# Patient Record
Sex: FEMALE | Race: WHITE | Employment: FULL TIME | URBAN - NONMETROPOLITAN AREA
[De-identification: names, ages, dates, MRNs, and addresses within clinical notes are randomized per-mention and may not be internally consistent; named-entity substitution may affect disease eponyms.]

---

## 2024-05-19 ENCOUNTER — APPOINTMENT (OUTPATIENT)
Dept: GENERAL RADIOLOGY | Age: 24
End: 2024-05-19
Payer: OTHER GOVERNMENT

## 2024-05-19 ENCOUNTER — HOSPITAL ENCOUNTER (EMERGENCY)
Age: 24
Discharge: HOME | End: 2024-05-19
Payer: OTHER GOVERNMENT

## 2024-05-19 VITALS
WEIGHT: 156 LBS | DIASTOLIC BLOOD PRESSURE: 71 MMHG | HEART RATE: 78 BPM | SYSTOLIC BLOOD PRESSURE: 121 MMHG | OXYGEN SATURATION: 100 % | RESPIRATION RATE: 20 BRPM | TEMPERATURE: 98.1 F

## 2024-05-19 DIAGNOSIS — M75.52 ACUTE BURSITIS OF LEFT SHOULDER: Primary | ICD-10-CM

## 2024-05-19 PROCEDURE — 99203 OFFICE O/P NEW LOW 30 MIN: CPT

## 2024-05-19 PROCEDURE — 73030 X-RAY EXAM OF SHOULDER: CPT

## 2024-05-19 PROCEDURE — 99203 OFFICE O/P NEW LOW 30 MIN: CPT | Performed by: NURSE PRACTITIONER

## 2024-05-19 RX ORDER — MENTHOL 787.5 MG/1
PATCH TOPICAL
Refills: 0 | COMMUNITY
Start: 2024-05-19

## 2024-05-19 RX ORDER — ALBUTEROL SULFATE 90 UG/1
2 AEROSOL, METERED RESPIRATORY (INHALATION) EVERY 6 HOURS PRN
COMMUNITY

## 2024-05-19 RX ORDER — IBUPROFEN 400 MG/1
400 TABLET ORAL EVERY 6 HOURS PRN
Qty: 120 TABLET | Refills: 0 | Status: SHIPPED | OUTPATIENT
Start: 2024-05-19

## 2024-05-19 ASSESSMENT — PAIN DESCRIPTION - LOCATION: LOCATION: SHOULDER

## 2024-05-19 ASSESSMENT — PAIN DESCRIPTION - ORIENTATION: ORIENTATION: LEFT

## 2024-05-19 ASSESSMENT — PAIN SCALES - GENERAL: PAINLEVEL_OUTOF10: 2

## 2024-05-19 ASSESSMENT — PAIN - FUNCTIONAL ASSESSMENT: PAIN_FUNCTIONAL_ASSESSMENT: 0-10
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
## 2024-10-11 ENCOUNTER — LAB (OUTPATIENT)
Dept: LAB | Age: 24
End: 2024-10-11

## 2024-10-16 LAB
SOURCE: NORMAL
TRICHOMONAS VAGINALI, MOLECULAR: NEGATIVE

## 2024-10-17 LAB
C. TRACHOMATIS DNA,THIN PREP: NEGATIVE
N. GONORRHOEAE DNA, THIN PREP: NEGATIVE
SOURCE: NORMAL

## 2024-10-21 ENCOUNTER — HOSPITAL ENCOUNTER (OUTPATIENT)
Age: 24
Discharge: HOME | End: 2024-10-21
Payer: OTHER GOVERNMENT

## 2024-10-21 LAB
ABO: NORMAL
ANTIBODY SCREEN: NORMAL
DEPRECATED RDW RBC AUTO: 40 FL (ref 35–45)
ERYTHROCYTE [DISTWIDTH] IN BLOOD BY AUTOMATED COUNT: 12.3 % (ref 11.5–14.5)
HBV SURFACE AG SERPL QL IA: NEGATIVE
HCT VFR BLD AUTO: 36.9 % (ref 37–47)
HCV IGG SERPL QL IA: NEGATIVE
HGB BLD-MCNC: 12.7 GM/DL (ref 12–16)
HIV 1+2 AB+HIV1 P24 AG SERPL QL IA: NORMAL
MCH RBC QN AUTO: 30.5 PG (ref 26–33)
MCHC RBC AUTO-ENTMCNC: 34.4 GM/DL (ref 32.2–35.5)
MCV RBC AUTO: 88.7 FL (ref 81–99)
PLATELET # BLD AUTO: 267 THOU/MM3 (ref 130–400)
PMV BLD AUTO: 9.6 FL (ref 9.4–12.4)
RBC # BLD AUTO: 4.16 MILL/MM3 (ref 4.2–5.4)
RH FACTOR: NORMAL
RPR SER QL: NONREACTIVE
RUBV IGG SERPL IA-ACNC: 99.2 IU/ML
WBC # BLD AUTO: 9.6 THOU/MM3 (ref 4.8–10.8)

## 2024-10-21 PROCEDURE — 86803 HEPATITIS C AB TEST: CPT

## 2024-10-21 PROCEDURE — 86900 BLOOD TYPING SEROLOGIC ABO: CPT

## 2024-10-21 PROCEDURE — 86850 RBC ANTIBODY SCREEN: CPT

## 2024-10-21 PROCEDURE — 86762 RUBELLA ANTIBODY: CPT

## 2024-10-21 PROCEDURE — 86592 SYPHILIS TEST NON-TREP QUAL: CPT

## 2024-10-21 PROCEDURE — 87086 URINE CULTURE/COLONY COUNT: CPT

## 2024-10-21 PROCEDURE — 87389 HIV-1 AG W/HIV-1&-2 AB AG IA: CPT

## 2024-10-21 PROCEDURE — 87340 HEPATITIS B SURFACE AG IA: CPT

## 2024-10-21 PROCEDURE — 86901 BLOOD TYPING SEROLOGIC RH(D): CPT

## 2024-10-21 PROCEDURE — 85027 COMPLETE CBC AUTOMATED: CPT

## 2024-10-21 PROCEDURE — 36415 COLL VENOUS BLD VENIPUNCTURE: CPT

## 2024-10-22 LAB
BACTERIA UR CULT: ABNORMAL
ORGANISM: ABNORMAL

## 2024-10-24 LAB — CYTOLOGY THIN PREP PAP: NORMAL

## 2025-02-13 ENCOUNTER — HOSPITAL ENCOUNTER (OUTPATIENT)
Age: 25
Discharge: HOME | End: 2025-02-13
Payer: OTHER GOVERNMENT

## 2025-02-13 LAB
DEPRECATED RDW RBC AUTO: 40.2 FL (ref 35–45)
ERYTHROCYTE [DISTWIDTH] IN BLOOD BY AUTOMATED COUNT: 12.6 % (ref 11.5–14.5)
GLUCOSE SERPL-MCNC: 133 MG/DL (ref 69–140)
HCT VFR BLD AUTO: 34.1 % (ref 37–47)
HGB BLD-MCNC: 11.6 GM/DL (ref 12–16)
MCH RBC QN AUTO: 30.1 PG (ref 26–33)
MCHC RBC AUTO-ENTMCNC: 34 GM/DL (ref 32.2–35.5)
MCV RBC AUTO: 88.3 FL (ref 81–99)
PLATELET # BLD AUTO: 267 THOU/MM3 (ref 130–400)
PMV BLD AUTO: 9.8 FL (ref 9.4–12.4)
RBC # BLD AUTO: 3.86 MILL/MM3 (ref 4.2–5.4)
WBC # BLD AUTO: 17.3 THOU/MM3 (ref 4.8–10.8)

## 2025-02-13 PROCEDURE — 85027 COMPLETE CBC AUTOMATED: CPT

## 2025-02-13 PROCEDURE — 86592 SYPHILIS TEST NON-TREP QUAL: CPT

## 2025-02-13 PROCEDURE — 36415 COLL VENOUS BLD VENIPUNCTURE: CPT

## 2025-02-13 PROCEDURE — 82950 GLUCOSE TEST: CPT

## 2025-02-14 LAB — RPR SER QL: NONREACTIVE

## 2025-02-24 ENCOUNTER — HOSPITAL ENCOUNTER (OUTPATIENT)
Dept: NURSING | Age: 25
Discharge: HOME | End: 2025-02-24
Payer: OTHER GOVERNMENT

## 2025-02-24 VITALS
SYSTOLIC BLOOD PRESSURE: 129 MMHG | OXYGEN SATURATION: 97 % | TEMPERATURE: 97.7 F | RESPIRATION RATE: 18 BRPM | DIASTOLIC BLOOD PRESSURE: 82 MMHG | HEART RATE: 76 BPM

## 2025-02-24 PROCEDURE — 96372 THER/PROPH/DIAG INJ SC/IM: CPT

## 2025-02-24 PROCEDURE — 6360000002 HC RX W HCPCS: Performed by: STUDENT IN AN ORGANIZED HEALTH CARE EDUCATION/TRAINING PROGRAM

## 2025-02-24 RX ORDER — BETAMETHASONE SODIUM PHOSPHATE AND BETAMETHASONE ACETATE 3; 3 MG/ML; MG/ML
12 INJECTION, SUSPENSION INTRA-ARTICULAR; INTRALESIONAL; INTRAMUSCULAR; SOFT TISSUE ONCE
Status: CANCELLED
Start: 2025-02-25 | End: 2025-02-25

## 2025-02-24 RX ORDER — BETAMETHASONE SODIUM PHOSPHATE AND BETAMETHASONE ACETATE 3; 3 MG/ML; MG/ML
12 INJECTION, SUSPENSION INTRA-ARTICULAR; INTRALESIONAL; INTRAMUSCULAR; SOFT TISSUE ONCE
Status: CANCELLED
Start: 2025-02-26

## 2025-02-24 RX ORDER — BETAMETHASONE SODIUM PHOSPHATE AND BETAMETHASONE ACETATE 3; 3 MG/ML; MG/ML
12 INJECTION, SUSPENSION INTRA-ARTICULAR; INTRALESIONAL; INTRAMUSCULAR; SOFT TISSUE ONCE
Status: COMPLETED | OUTPATIENT
Start: 2025-02-24 | End: 2025-02-24

## 2025-02-24 RX ADMIN — BETAMETHASONE SODIUM PHOSPHATE AND BETAMETHASONE ACETATE 12 MG: 3; 3 INJECTION, SUSPENSION INTRA-ARTICULAR; INTRALESIONAL; INTRAMUSCULAR at 15:01

## 2025-02-24 NOTE — PROGRESS NOTES
1500 Patient ambulatory to OPN for IM injection of Betamethasone. Patient verbalizes understanding of infusion. PT RIGHTS AND RESPONSIBILITIES OFFERED TO PT.    1501 Injection given to patient. She tolerated well. AVS Reviewed with patient. Verbalizes understanding.     1510 Patient discharged ambulatory to discharge noris.         _M___ Safety:       (Environmental)  [place] to environment  Ensure ID band is correct and in place/ allergy band as needed  Assess for fall risk  Initiate fall precautions as applicable (fall band, side rails, etc.)  Call light within reach  Bed in low position/ wheels locked    _M___ Pain:       Assess pain level and characteristics  Administer analgesics as ordered  Assess effectiveness of pain management and report to MD as needed    _M___ Knowledge Deficit:  Assess baseline knowledge  Provide teaching at level of understanding  Provide teaching via preferred learning method  Evaluate teaching effectiveness    _M___ Hemodynamic/Respiratory Status:       (Pre and Post Procedure Monitoring)  Assess/Monitor vital signs and LOC  Assess Baseline SpO2 prior to any sedation  Obtain weight/height  Assess vital signs/ LOC until patient meets discharge criteria  Monitor procedure site and notify MD of any issues

## 2025-02-25 ENCOUNTER — HOSPITAL ENCOUNTER (OUTPATIENT)
Dept: NURSING | Age: 25
Discharge: HOME | End: 2025-02-25
Payer: OTHER GOVERNMENT

## 2025-02-25 VITALS
RESPIRATION RATE: 18 BRPM | OXYGEN SATURATION: 98 % | SYSTOLIC BLOOD PRESSURE: 121 MMHG | DIASTOLIC BLOOD PRESSURE: 60 MMHG | HEART RATE: 80 BPM | TEMPERATURE: 96.8 F

## 2025-02-25 PROCEDURE — 96372 THER/PROPH/DIAG INJ SC/IM: CPT

## 2025-02-25 PROCEDURE — 6360000002 HC RX W HCPCS: Performed by: STUDENT IN AN ORGANIZED HEALTH CARE EDUCATION/TRAINING PROGRAM

## 2025-02-25 RX ORDER — BETAMETHASONE SODIUM PHOSPHATE AND BETAMETHASONE ACETATE 3; 3 MG/ML; MG/ML
12 INJECTION, SUSPENSION INTRA-ARTICULAR; INTRALESIONAL; INTRAMUSCULAR; SOFT TISSUE ONCE
Status: COMPLETED | OUTPATIENT
Start: 2025-02-25 | End: 2025-02-25

## 2025-02-25 RX ADMIN — BETAMETHASONE SODIUM PHOSPHATE AND BETAMETHASONE ACETATE 12 MG: 3; 3 INJECTION, SUSPENSION INTRA-ARTICULAR; INTRALESIONAL; INTRAMUSCULAR at 14:50

## 2025-02-25 ASSESSMENT — PAIN - FUNCTIONAL ASSESSMENT: PAIN_FUNCTIONAL_ASSESSMENT: 0-10

## 2025-02-25 NOTE — PROGRESS NOTES
1445 pt admitted to opn per ambulation ofr a betamethosone injection.   Pt verbalize understanding of medication. Fletcher yesterday dose wll.  PT RIGHTS AND RESPONSIBILITIES OFFERED TO PT.  1450 SHOT GIVEN. FLETCHER WELL. STABLE.  DISCHARGE INSTRUCTIONS GIVEN TO PATIENT. VERBALIZE UNDERSTANDING OF HOME GOING.   1456 DISCHARGE PER AMBULATION TO HOME.                 _m___ Safety:       (Environmental)  [place] to environment  Ensure ID band is correct and in place/ allergy band as needed  Assess for fall risk  Initiate fall precautions as applicable (fall band, side rails, etc.)  Call light within reach  Bed in low position/ wheels locked    _m___ Pain:       Assess pain level and characteristics  Administer analgesics as ordered  Assess effectiveness of pain management and report to MD as needed    __m__ Knowledge Deficit:  Assess baseline knowledge  Provide teaching at level of understanding  Provide teaching via preferred learning method  Evaluate teaching effectiveness    __m__ Hemodynamic/Respiratory Status:       (Pre and Post Procedure Monitoring)  Assess/Monitor vital signs and LOC  Assess Baseline SpO2 prior to any sedation  Obtain weight/height  Assess vital signs/ LOC until patient meets discharge criteria  Monitor procedure site and notify MD of any issues

## 2025-03-13 ENCOUNTER — HOSPITAL ENCOUNTER (INPATIENT)
Age: 25
LOS: 2 days | Discharge: HOME | End: 2025-03-15
Attending: STUDENT IN AN ORGANIZED HEALTH CARE EDUCATION/TRAINING PROGRAM | Admitting: STUDENT IN AN ORGANIZED HEALTH CARE EDUCATION/TRAINING PROGRAM
Payer: OTHER GOVERNMENT

## 2025-03-13 ENCOUNTER — APPOINTMENT (OUTPATIENT)
Dept: LABOR AND DELIVERY | Age: 25
End: 2025-03-13
Payer: OTHER GOVERNMENT

## 2025-03-13 PROBLEM — O36.4XX0 FETAL DEMISE IN SINGLETON PREGNANCY GREATER THAN 22 WEEKS GESTATION, ANTEPARTUM: Status: ACTIVE | Noted: 2025-03-13

## 2025-03-13 LAB
ABO GROUP BLD: NORMAL
AMPHETAMINES UR QL SCN: NEGATIVE
BARBITURATES UR QL SCN: NEGATIVE
BASOPHILS ABSOLUTE: 0 THOU/MM3 (ref 0–0.1)
BASOPHILS NFR BLD AUTO: 0.4 %
BENZODIAZ UR QL SCN: NEGATIVE
BZE UR QL SCN: NEGATIVE
CANNABINOIDS UR QL SCN: NEGATIVE
DEPRECATED RDW RBC AUTO: 42.4 FL (ref 35–45)
EOSINOPHIL NFR BLD AUTO: 0.8 %
EOSINOPHILS ABSOLUTE: 0.1 THOU/MM3 (ref 0–0.4)
ERYTHROCYTE [DISTWIDTH] IN BLOOD BY AUTOMATED COUNT: 13 % (ref 11.5–14.5)
FENTANYL: NEGATIVE
HCT VFR BLD AUTO: 36.1 % (ref 37–47)
HGB BLD-MCNC: 12.2 GM/DL (ref 12–16)
IAT IGG-SP REAG SERPL QL: NORMAL
IMM GRANULOCYTES # BLD AUTO: 0.32 THOU/MM3 (ref 0–0.07)
IMM GRANULOCYTES NFR BLD AUTO: 2.6 %
LYMPHOCYTES ABSOLUTE: 2.2 THOU/MM3 (ref 1–4.8)
LYMPHOCYTES NFR BLD AUTO: 18.2 %
MCH RBC QN AUTO: 30 PG (ref 26–33)
MCHC RBC AUTO-ENTMCNC: 33.8 GM/DL (ref 32.2–35.5)
MCV RBC AUTO: 88.9 FL (ref 81–99)
MONOCYTES ABSOLUTE: 0.8 THOU/MM3 (ref 0.4–1.3)
MONOCYTES NFR BLD AUTO: 6.5 %
NEUTROPHILS ABSOLUTE: 8.8 THOU/MM3 (ref 1.8–7.7)
NEUTROPHILS NFR BLD AUTO: 71.5 %
NRBC BLD AUTO-RTO: 0 /100 WBC
OPIATES UR QL SCN: NEGATIVE
OXYCODONE: NEGATIVE
PCP UR QL SCN: NEGATIVE
PLATELET # BLD AUTO: 244 THOU/MM3 (ref 130–400)
PMV BLD AUTO: 10 FL (ref 9.4–12.4)
RBC # BLD AUTO: 4.06 MILL/MM3 (ref 4.2–5.4)
RH BLD: NORMAL
RPR SER QL: NONREACTIVE
WBC # BLD AUTO: 12.3 THOU/MM3 (ref 4.8–10.8)

## 2025-03-13 PROCEDURE — 85025 COMPLETE CBC W/AUTO DIFF WBC: CPT

## 2025-03-13 PROCEDURE — 6370000000 HC RX 637 (ALT 250 FOR IP): Performed by: STUDENT IN AN ORGANIZED HEALTH CARE EDUCATION/TRAINING PROGRAM

## 2025-03-13 PROCEDURE — 86901 BLOOD TYPING SEROLOGIC RH(D): CPT

## 2025-03-13 PROCEDURE — 80307 DRUG TEST PRSMV CHEM ANLYZR: CPT

## 2025-03-13 PROCEDURE — 1220000001 HC SEMI PRIVATE L&D R&B

## 2025-03-13 PROCEDURE — 86885 COOMBS TEST INDIRECT QUAL: CPT

## 2025-03-13 PROCEDURE — 2580000003 HC RX 258: Performed by: STUDENT IN AN ORGANIZED HEALTH CARE EDUCATION/TRAINING PROGRAM

## 2025-03-13 PROCEDURE — 86592 SYPHILIS TEST NON-TREP QUAL: CPT

## 2025-03-13 PROCEDURE — 86900 BLOOD TYPING SEROLOGIC ABO: CPT

## 2025-03-13 PROCEDURE — 6360000002 HC RX W HCPCS: Performed by: STUDENT IN AN ORGANIZED HEALTH CARE EDUCATION/TRAINING PROGRAM

## 2025-03-13 RX ORDER — PROCHLORPERAZINE MALEATE 10 MG
10 TABLET ORAL
Status: DISCONTINUED | OUTPATIENT
Start: 2025-03-13 | End: 2025-03-14

## 2025-03-13 RX ORDER — CARBOPROST TROMETHAMINE 250 UG/ML
250 INJECTION, SOLUTION INTRAMUSCULAR PRN
Status: DISCONTINUED | OUTPATIENT
Start: 2025-03-13 | End: 2025-03-14

## 2025-03-13 RX ORDER — MEPERIDINE HYDROCHLORIDE 25 MG/ML
50 INJECTION INTRAMUSCULAR; INTRAVENOUS; SUBCUTANEOUS
Status: DISCONTINUED | OUTPATIENT
Start: 2025-03-13 | End: 2025-03-14

## 2025-03-13 RX ORDER — ACETAMINOPHEN 500 MG
1000 TABLET ORAL EVERY 8 HOURS PRN
Status: DISCONTINUED | OUTPATIENT
Start: 2025-03-13 | End: 2025-03-14

## 2025-03-13 RX ORDER — OXYTOCIN/0.9 % SODIUM CHLORIDE 30/500 ML
1-20 PLASTIC BAG, INJECTION (ML) INTRAVENOUS CONTINUOUS
Status: DISCONTINUED | OUTPATIENT
Start: 2025-03-13 | End: 2025-03-14

## 2025-03-13 RX ORDER — DIPHENOXYLATE HYDROCHLORIDE AND ATROPINE SULFATE 2.5; .025 MG/1; MG/1
1 TABLET ORAL 4 TIMES DAILY PRN
Status: DISCONTINUED | OUTPATIENT
Start: 2025-03-13 | End: 2025-03-14

## 2025-03-13 RX ORDER — ONDANSETRON 2 MG/ML
4 INJECTION INTRAMUSCULAR; INTRAVENOUS EVERY 6 HOURS PRN
Status: DISCONTINUED | OUTPATIENT
Start: 2025-03-13 | End: 2025-03-14

## 2025-03-13 RX ORDER — FENTANYL CITRATE 50 UG/ML
50 INJECTION, SOLUTION INTRAMUSCULAR; INTRAVENOUS
Status: DISCONTINUED | OUTPATIENT
Start: 2025-03-13 | End: 2025-03-14

## 2025-03-13 RX ORDER — SODIUM CHLORIDE, SODIUM LACTATE, POTASSIUM CHLORIDE, CALCIUM CHLORIDE 600; 310; 30; 20 MG/100ML; MG/100ML; MG/100ML; MG/100ML
INJECTION, SOLUTION INTRAVENOUS CONTINUOUS
Status: DISCONTINUED | OUTPATIENT
Start: 2025-03-13 | End: 2025-03-14

## 2025-03-13 RX ORDER — TRANEXAMIC ACID 10 MG/ML
1000 INJECTION, SOLUTION INTRAVENOUS
Status: DISCONTINUED | OUTPATIENT
Start: 2025-03-13 | End: 2025-03-14

## 2025-03-13 RX ORDER — MISOPROSTOL 200 UG/1
200 TABLET ORAL
Status: DISCONTINUED | OUTPATIENT
Start: 2025-03-13 | End: 2025-03-13

## 2025-03-13 RX ORDER — DIPHENHYDRAMINE HCL 25 MG
25 TABLET ORAL EVERY 4 HOURS PRN
Status: DISCONTINUED | OUTPATIENT
Start: 2025-03-13 | End: 2025-03-14

## 2025-03-13 RX ORDER — MISOPROSTOL 200 UG/1
400 TABLET ORAL PRN
Status: DISCONTINUED | OUTPATIENT
Start: 2025-03-13 | End: 2025-03-14

## 2025-03-13 RX ORDER — OXYTOCIN/0.9 % SODIUM CHLORIDE 30/500 ML
PLASTIC BAG, INJECTION (ML) INTRAVENOUS
Status: DISCONTINUED
Start: 2025-03-13 | End: 2025-03-14

## 2025-03-13 RX ORDER — METHYLERGONOVINE MALEATE 0.2 MG/ML
200 INJECTION INTRAVENOUS PRN
Status: DISCONTINUED | OUTPATIENT
Start: 2025-03-13 | End: 2025-03-14

## 2025-03-13 RX ORDER — ACETAMINOPHEN 500 MG
1000 TABLET ORAL EVERY 8 HOURS
Status: DISCONTINUED | OUTPATIENT
Start: 2025-03-13 | End: 2025-03-13

## 2025-03-13 RX ADMIN — SODIUM CHLORIDE, SODIUM LACTATE, POTASSIUM CHLORIDE, AND CALCIUM CHLORIDE: .6; .31; .03; .02 INJECTION, SOLUTION INTRAVENOUS at 23:31

## 2025-03-13 RX ADMIN — Medication 25 MCG: at 13:18

## 2025-03-13 RX ADMIN — FENTANYL CITRATE 50 MCG: 50 INJECTION, SOLUTION INTRAMUSCULAR; INTRAVENOUS at 23:42

## 2025-03-13 RX ADMIN — Medication 1 MILLI-UNITS/MIN: at 23:38

## 2025-03-13 RX ADMIN — SODIUM CHLORIDE, SODIUM LACTATE, POTASSIUM CHLORIDE, AND CALCIUM CHLORIDE: .6; .31; .03; .02 INJECTION, SOLUTION INTRAVENOUS at 06:58

## 2025-03-13 RX ADMIN — Medication 25 MCG: at 08:20

## 2025-03-13 RX ADMIN — SODIUM CHLORIDE, SODIUM LACTATE, POTASSIUM CHLORIDE, AND CALCIUM CHLORIDE: .6; .31; .03; .02 INJECTION, SOLUTION INTRAVENOUS at 15:14

## 2025-03-13 ASSESSMENT — PAIN DESCRIPTION - LOCATION: LOCATION: ABDOMEN

## 2025-03-13 ASSESSMENT — PAIN SCALES - GENERAL: PAINLEVEL_OUTOF10: 5

## 2025-03-13 NOTE — FLOWSHEET NOTE
Dr Simpson at bedside. Amnisure running now, appears to have thick yeast, pt does not c/o of itching or pain.

## 2025-03-13 NOTE — FLOWSHEET NOTE
called, RN left second msg since pt has questions concerning the hospital burial and if they are able to get any ashes.

## 2025-03-13 NOTE — PROGRESS NOTES
Spiritual Health History and Assessment/Progress Note  [facility]    (P) Spiritual/Emotional Needs,  , (P) Grief and loss,      Name: Rakesh Rodriguez MRN: [id number]    Age: 24 y.o.     Sex: female   Language: English   Gnosticist: Quaker   Fetal demise in pan pregnancy greater than 22 weeks gestation, antepartum     Date: 3/13/2025            Total Time Calculated: (P) 33 min              Spiritual Assessment began in [facility] LABOR & DELIVERY 5C        Referral/Consult From: (P) Nurse   Encounter Overview/Reason: (P) Spiritual/Emotional Needs  Service Provided For: (P) Patient and family together    Crystal, Belief, Meaning:   Patient identifies as spiritual  Family/Friends identify as spiritual      Importance and Influence:  Patient has spiritual/personal beliefs that influence decisions regarding their health  Family/Friends have spiritual/personal beliefs that influence decisions regarding the patient's health    Community:  Patient Other: None  Family/Friends Other: None    Assessment and Plan of Care:   Rakesh is a 24 year old female admitted on 5C for Fetal demise in pan Pregnancy greate than 22 weeks gestration, antepartum medical condition. Patient is in the room with her spouse Robinson. Patient and Cristian shared with me that during their recent visit, they were told the baby did not have heartbeat and so mom is admitted for special procedure. I shared with them that my visit to them is in response to a spiritual consult to provide spiritual and emotional support to them. During this conversation, patient states their parents arrived from another state Patient and spouse informed me that they have named the baby \"Hutchin.\"    I offered words of comfort, hope, peace and healing to them. I read several passages from the Psalm as well as Todd Waldron. I offered prayer of encouragement for families experiencing loss of a child. I also asked family if they were okay for one of

## 2025-03-13 NOTE — FLOWSHEET NOTE
Analisa Snyder Supervisor on the unit, notified about fetal demise. Call [place] police when the baby is ready to go to the [facility].

## 2025-03-13 NOTE — FLOWSHEET NOTE
RN discussing pts plans for burial, pt wishes to have the hospital cremation/burial. Pt does wish to have [facility] come in, and also pastoral care after delivery. Pt calm, s/o at bedside.

## 2025-03-13 NOTE — FLOWSHEET NOTE
Pt sitting up eating lunch, denies pain only tightening, will check cervix and place next dose of Cytotec when pt done eating.

## 2025-03-13 NOTE — FLOWSHEET NOTE
Pt asking if hospital can give them the babys ashes. RN will call social work to confirm the hospital burial procedures.

## 2025-03-13 NOTE — FLOWSHEET NOTE
03/13/25 0634   Provider Notification   Reason for Communication Notified Provider of Admission   Name of Team Member Notified [facility]   Treatment Team Role Attending Provider   Method of Communication Call   Response See orders     Orders received and plan of care reviewed

## 2025-03-13 NOTE — FLOWSHEET NOTE
RN called [facility] spoke with Bella, they will check back in at some point later.

## 2025-03-13 NOTE — FLOWSHEET NOTE
Triage assessment completed. Patient denies contractions or vaginal bleeding. Plan of care to call Dr Lainez for orders. Patient denies questions at this time.

## 2025-03-13 NOTE — FLOWSHEET NOTE
[facility] called the unit, RN spoke with Bella, she states they have someone on call for pt until 10 pm, if pt delivers after that they will arrive in the morning, still call them at any time throughout the night to let them know when delivered.

## 2025-03-13 NOTE — H&P
[facility]  History and Physical Update    Pt Name: Rakesh Rodriguez  MRN: [id number]  YOB: 2000  Date of evaluation: 3/13/2025    [] I have examined the patient and reviewed the H&P/Consult and there are no changes to the patient or plans.    [x] I have examined the patient and reviewed the H&P/Consult and have noted the following changes:   25yo  @31w0d admitted for IOL. Pt was up at [facility] yesterday for scheduled visit and fetal demise was diagnosed on ultrasound. Pt has been following with MFM at [facility] due to baby having dilated cardiomyopathy and VSD. She had an amniocentesis and karyotype came back normal. The whole genome sequencing is still pending. She does not desire an autopsy or any other genetic testing here. Pt herself also was born with a septal defect that was surgically repaired. She had a normal echo this pregnancy. Her first pregnancy in  also ended in demise around 23wks. 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  Will start IOL with vaginal cytotec. Once cervix more favorable, will transition to Pitocin and AROM.  May have regular diet  Staff to contact [facility]  Support provided to patient and FOB and all questions answered        Discussion with the patient and/ or family for proposed care, treatment, services; benefits, risks, side effects; likelihood of achieving goals and potential problems that may occur during recuperation was had and all questions were answered.  Discussion with the patient and/ or family of reasonable alternatives to the proposed care, treatment, services and the discussion of the risks, benefits, side effects related to the alternatives and the risk related to not receiving the proposed care treatment services was also had and all questions were answered.    If this is for an elective surgical procedure then The patient was counseled at length about the risks of lev Covid-19 during their perioperative period and any recovery  window from their procedure.  The patient was made aware that lev Covid-19  may worsen their prognosis for recovering from their procedure  and lend to a higher morbidity and/or mortality risk.  All material risks, benefits, and reasonable alternatives including postponing the procedure were discussed. The patient  does wish to proceed with the procedure at this time.             Vianney Lainez DO  Electronically signed 3/13/2025 at 8:15 AM

## 2025-03-13 NOTE — FLOWSHEET NOTE
Dr Simpson OB hospitalist at the desk for an update, Dr Lainez has asked her to break water once she is dilated. Pt had first dose of Cytotec at 0820, did not give next dose since pt is lev every 2-3 min, pt is feeling tightening but not pain. Md states to go ahead and place next dose of Cytotec.

## 2025-03-13 NOTE — FLOWSHEET NOTE
Patient is 30 weeks gestation that arrived to L&D for scheduled induction for know fetal demise. Patient to bathroom to void, informed of maternal drug testing policy in place on all laboring patients. Verbal consent received, paper consent to be signed and urine to be sent.

## 2025-03-13 NOTE — PLAN OF CARE
Problem: Pain  Goal: Verbalizes/displays adequate comfort level or baseline comfort level  Outcome: Progressing  Flowsheets (Taken 3/13/2025 0757)  Verbalizes/displays adequate comfort level or baseline comfort level:   Encourage patient to monitor pain and request assistance   Assess pain using appropriate pain scale   Administer analgesics based on type and severity of pain and evaluate response   Implement non-pharmacological measures as appropriate and evaluate response   Consider cultural and social influences on pain and pain management   Notify Licensed Independent Practitioner if interventions unsuccessful or patient reports new pain     Problem: Vaginal Birth or  Section  Goal: Fetal and maternal status remain reassuring during the birth process  Description:  Birth OB-Pregnancy care plan goal which identifies if the fetal and maternal status remain reassuring during the birth process  Outcome: Progressing  Flowsheets (Taken 3/13/2025 0757)  Fetal and Maternal Status Remain Reassuring During the Birth Process:   Monitor vital signs   Monitor uterine activity   Monitor fetal heart rate   Monitor labor progression (Vaginal delivery)     Problem: Infection - Adult  Goal: Absence of infection during hospitalization  Outcome: Progressing  Flowsheets (Taken 3/13/2025 0757)  Absence of infection during hospitalization:   Assess and monitor for signs and symptoms of infection   Monitor lab/diagnostic results   Monitor all insertion sites i.e., indwelling lines, tubes and drains   [place] appropriate cooling/warming therapies per order   Administer medications as ordered   Instruct and encourage patient and family to use good hand hygiene technique   Identify and instruct in appropriate isolation precautions for identified infection/condition     Problem: Safety - Adult  Goal: Free from fall injury  Outcome: Progressing  Flowsheets (Taken 3/13/2025 0757)  Free From Fall Injury:   Instruct  family/caregiver on patient safety   Based on caregiver fall risk screen, instruct family/caregiver to ask for assistance with transferring infant if caregiver noted to have fall risk factors     Problem: Discharge Planning  Goal: Discharge to home or other facility with appropriate resources  Outcome: Progressing  Flowsheets (Taken 3/13/2025 0757)  Discharge to home or other facility with appropriate resources:   Identify barriers to discharge with patient and caregiver   Arrange for needed discharge resources and transportation as appropriate   Identify discharge learning needs (meds, wound care, etc)   Arrange for interpreters to assist at discharge as needed   Refer to discharge planning if patient needs post-hospital services based on physician order or complex needs related to functional status, cognitive ability or social support system     Problem: Chronic Conditions and Co-morbidities  Goal: Patient's chronic conditions and co-morbidity symptoms are monitored and maintained or improved  Outcome: Progressing  Flowsheets (Taken 3/13/2025 0757)  Care Plan - Patient's Chronic Conditions and Co-Morbidity Symptoms are Monitored and Maintained or Improved:   Monitor and assess patient's chronic conditions and comorbid symptoms for stability, deterioration, or improvement   Collaborate with multidisciplinary team to address chronic and comorbid conditions and prevent exacerbation or deterioration   Update acute care plan with appropriate goals if chronic or comorbid symptoms are exacerbated and prevent overall improvement and discharge

## 2025-03-14 ENCOUNTER — ANESTHESIA EVENT (OUTPATIENT)
Dept: LABOR AND DELIVERY | Age: 25
End: 2025-03-14
Payer: OTHER GOVERNMENT

## 2025-03-14 ENCOUNTER — ANESTHESIA (OUTPATIENT)
Dept: LABOR AND DELIVERY | Age: 25
End: 2025-03-14
Payer: OTHER GOVERNMENT

## 2025-03-14 PROCEDURE — 3700000025 EPIDURAL BLOCK: Performed by: ANESTHESIOLOGY

## 2025-03-14 PROCEDURE — 51701 INSERT BLADDER CATHETER: CPT

## 2025-03-14 PROCEDURE — 6360000002 HC RX W HCPCS: Performed by: NURSE ANESTHETIST, CERTIFIED REGISTERED

## 2025-03-14 PROCEDURE — 2500000003 HC RX 250 WO HCPCS: Performed by: NURSE ANESTHETIST, CERTIFIED REGISTERED

## 2025-03-14 PROCEDURE — 1220000001 HC SEMI PRIVATE L&D R&B

## 2025-03-14 PROCEDURE — 10S0XZZ REPOSITION PRODUCTS OF CONCEPTION, EXTERNAL APPROACH: ICD-10-PCS | Performed by: STUDENT IN AN ORGANIZED HEALTH CARE EDUCATION/TRAINING PROGRAM

## 2025-03-14 PROCEDURE — 10907ZC DRAINAGE OF AMNIOTIC FLUID, THERAPEUTIC FROM PRODUCTS OF CONCEPTION, VIA NATURAL OR ARTIFICIAL OPENING: ICD-10-PCS | Performed by: STUDENT IN AN ORGANIZED HEALTH CARE EDUCATION/TRAINING PROGRAM

## 2025-03-14 PROCEDURE — 6360000002 HC RX W HCPCS: Performed by: STUDENT IN AN ORGANIZED HEALTH CARE EDUCATION/TRAINING PROGRAM

## 2025-03-14 PROCEDURE — 88307 TISSUE EXAM BY PATHOLOGIST: CPT

## 2025-03-14 PROCEDURE — 7200000001 HC VAGINAL DELIVERY

## 2025-03-14 PROCEDURE — 6370000000 HC RX 637 (ALT 250 FOR IP): Performed by: STUDENT IN AN ORGANIZED HEALTH CARE EDUCATION/TRAINING PROGRAM

## 2025-03-14 PROCEDURE — 2580000003 HC RX 258: Performed by: STUDENT IN AN ORGANIZED HEALTH CARE EDUCATION/TRAINING PROGRAM

## 2025-03-14 RX ORDER — SODIUM CHLORIDE, SODIUM LACTATE, POTASSIUM CHLORIDE, CALCIUM CHLORIDE 600; 310; 30; 20 MG/100ML; MG/100ML; MG/100ML; MG/100ML
INJECTION, SOLUTION INTRAVENOUS CONTINUOUS
Status: DISCONTINUED | OUTPATIENT
Start: 2025-03-14 | End: 2025-03-15 | Stop reason: HOSPADM

## 2025-03-14 RX ORDER — FERROUS SULFATE 325(65) MG
325 TABLET ORAL
Status: DISCONTINUED | OUTPATIENT
Start: 2025-03-15 | End: 2025-03-15 | Stop reason: HOSPADM

## 2025-03-14 RX ORDER — LIDOCAINE HCL/EPINEPHRINE/PF 2%-1:200K
VIAL (ML) INJECTION
Status: DISCONTINUED | OUTPATIENT
Start: 2025-03-14 | End: 2025-03-14 | Stop reason: SDUPTHER

## 2025-03-14 RX ORDER — ONDANSETRON 2 MG/ML
4 INJECTION INTRAMUSCULAR; INTRAVENOUS EVERY 6 HOURS PRN
Status: DISCONTINUED | OUTPATIENT
Start: 2025-03-14 | End: 2025-03-15 | Stop reason: HOSPADM

## 2025-03-14 RX ORDER — ACETAMINOPHEN 500 MG
1000 TABLET ORAL EVERY 8 HOURS SCHEDULED
Status: DISCONTINUED | OUTPATIENT
Start: 2025-03-14 | End: 2025-03-15 | Stop reason: HOSPADM

## 2025-03-14 RX ORDER — MODIFIED LANOLIN
OINTMENT (GRAM) TOPICAL PRN
Status: DISCONTINUED | OUTPATIENT
Start: 2025-03-14 | End: 2025-03-15 | Stop reason: HOSPADM

## 2025-03-14 RX ORDER — ONDANSETRON 4 MG/1
4 TABLET, ORALLY DISINTEGRATING ORAL EVERY 6 HOURS PRN
Status: DISCONTINUED | OUTPATIENT
Start: 2025-03-14 | End: 2025-03-15 | Stop reason: HOSPADM

## 2025-03-14 RX ORDER — OXYCODONE HYDROCHLORIDE 5 MG/1
5 TABLET ORAL EVERY 4 HOURS PRN
Status: DISCONTINUED | OUTPATIENT
Start: 2025-03-14 | End: 2025-03-15 | Stop reason: HOSPADM

## 2025-03-14 RX ORDER — CARBOPROST TROMETHAMINE 250 UG/ML
250 INJECTION, SOLUTION INTRAMUSCULAR PRN
Status: DISCONTINUED | OUTPATIENT
Start: 2025-03-14 | End: 2025-03-15 | Stop reason: HOSPADM

## 2025-03-14 RX ORDER — ONDANSETRON 2 MG/ML
4 INJECTION INTRAMUSCULAR; INTRAVENOUS EVERY 6 HOURS PRN
Status: DISCONTINUED | OUTPATIENT
Start: 2025-03-14 | End: 2025-03-14

## 2025-03-14 RX ORDER — LIDOCAINE HYDROCHLORIDE 10 MG/ML
INJECTION, SOLUTION INFILTRATION; PERINEURAL
Status: DISCONTINUED | OUTPATIENT
Start: 2025-03-14 | End: 2025-03-14 | Stop reason: SDUPTHER

## 2025-03-14 RX ORDER — MISOPROSTOL 200 UG/1
1000 TABLET ORAL PRN
Status: DISCONTINUED | OUTPATIENT
Start: 2025-03-14 | End: 2025-03-15 | Stop reason: HOSPADM

## 2025-03-14 RX ORDER — EPHEDRINE SULFATE 5 MG/ML
10 INJECTION INTRAVENOUS EVERY 5 MIN PRN
Status: DISCONTINUED | OUTPATIENT
Start: 2025-03-14 | End: 2025-03-14

## 2025-03-14 RX ORDER — SODIUM CHLORIDE 9 MG/ML
INJECTION, SOLUTION INTRAVENOUS PRN
Status: DISCONTINUED | OUTPATIENT
Start: 2025-03-14 | End: 2025-03-15 | Stop reason: HOSPADM

## 2025-03-14 RX ORDER — FAMOTIDINE 20 MG/1
20 TABLET, FILM COATED ORAL 2 TIMES DAILY PRN
Status: DISCONTINUED | OUTPATIENT
Start: 2025-03-14 | End: 2025-03-15 | Stop reason: HOSPADM

## 2025-03-14 RX ORDER — OXYCODONE HYDROCHLORIDE 5 MG/1
10 TABLET ORAL EVERY 4 HOURS PRN
Status: DISCONTINUED | OUTPATIENT
Start: 2025-03-14 | End: 2025-03-15 | Stop reason: HOSPADM

## 2025-03-14 RX ORDER — ALBUTEROL SULFATE 90 UG/1
2 INHALANT RESPIRATORY (INHALATION) EVERY 6 HOURS PRN
Status: DISCONTINUED | OUTPATIENT
Start: 2025-03-14 | End: 2025-03-15 | Stop reason: HOSPADM

## 2025-03-14 RX ORDER — METHYLERGONOVINE MALEATE 0.2 MG/ML
200 INJECTION INTRAVENOUS PRN
Status: DISCONTINUED | OUTPATIENT
Start: 2025-03-14 | End: 2025-03-15 | Stop reason: HOSPADM

## 2025-03-14 RX ORDER — DOCUSATE SODIUM 100 MG/1
100 CAPSULE, LIQUID FILLED ORAL 2 TIMES DAILY PRN
Status: DISCONTINUED | OUTPATIENT
Start: 2025-03-14 | End: 2025-03-15 | Stop reason: HOSPADM

## 2025-03-14 RX ORDER — IBUPROFEN 800 MG/1
800 TABLET, FILM COATED ORAL EVERY 8 HOURS SCHEDULED
Status: DISCONTINUED | OUTPATIENT
Start: 2025-03-14 | End: 2025-03-15 | Stop reason: HOSPADM

## 2025-03-14 RX ORDER — NALOXONE HYDROCHLORIDE 0.4 MG/ML
INJECTION, SOLUTION INTRAMUSCULAR; INTRAVENOUS; SUBCUTANEOUS PRN
Status: DISCONTINUED | OUTPATIENT
Start: 2025-03-14 | End: 2025-03-14

## 2025-03-14 RX ORDER — SODIUM CHLORIDE 0.9 % (FLUSH) 0.9 %
5-40 SYRINGE (ML) INJECTION PRN
Status: DISCONTINUED | OUTPATIENT
Start: 2025-03-14 | End: 2025-03-15 | Stop reason: HOSPADM

## 2025-03-14 RX ORDER — SODIUM CHLORIDE 0.9 % (FLUSH) 0.9 %
5-40 SYRINGE (ML) INJECTION EVERY 12 HOURS SCHEDULED
Status: DISCONTINUED | OUTPATIENT
Start: 2025-03-14 | End: 2025-03-15 | Stop reason: HOSPADM

## 2025-03-14 RX ORDER — MISOPROSTOL 200 UG/1
TABLET ORAL
Status: DISCONTINUED
Start: 2025-03-14 | End: 2025-03-14

## 2025-03-14 RX ADMIN — LIDOCAINE HYDROCHLORIDE AND EPINEPHRINE 3 ML: 20; 5 INJECTION, SOLUTION EPIDURAL; INFILTRATION; INTRACAUDAL; PERINEURAL at 04:40

## 2025-03-14 RX ADMIN — Medication 2 MILLI-UNITS/MIN: at 00:29

## 2025-03-14 RX ADMIN — IBUPROFEN 800 MG: 800 TABLET, FILM COATED ORAL at 16:05

## 2025-03-14 RX ADMIN — ONDANSETRON 4 MG: 2 INJECTION, SOLUTION INTRAMUSCULAR; INTRAVENOUS at 07:35

## 2025-03-14 RX ADMIN — Medication 10 ML: at 04:45

## 2025-03-14 RX ADMIN — LIDOCAINE HYDROCHLORIDE 3 ML: 10 INJECTION, SOLUTION INFILTRATION; PERINEURAL at 04:34

## 2025-03-14 RX ADMIN — FENTANYL CITRATE 50 MCG: 50 INJECTION, SOLUTION INTRAMUSCULAR; INTRAVENOUS at 02:44

## 2025-03-14 RX ADMIN — SODIUM CHLORIDE, SODIUM LACTATE, POTASSIUM CHLORIDE, AND CALCIUM CHLORIDE: .6; .31; .03; .02 INJECTION, SOLUTION INTRAVENOUS at 04:30

## 2025-03-14 RX ADMIN — Medication 999 MILLI-UNITS/MIN: at 12:03

## 2025-03-14 RX ADMIN — LIDOCAINE HYDROCHLORIDE AND EPINEPHRINE 2 ML: 20; 5 INJECTION, SOLUTION EPIDURAL; INFILTRATION; INTRACAUDAL; PERINEURAL at 04:44

## 2025-03-14 RX ADMIN — Medication 18 ML/HR: at 04:46

## 2025-03-14 RX ADMIN — ACETAMINOPHEN 1000 MG: 500 TABLET ORAL at 22:53

## 2025-03-14 RX ADMIN — SODIUM CHLORIDE, SODIUM LACTATE, POTASSIUM CHLORIDE, AND CALCIUM CHLORIDE: .6; .31; .03; .02 INJECTION, SOLUTION INTRAVENOUS at 12:57

## 2025-03-14 ASSESSMENT — PAIN DESCRIPTION - LOCATION: LOCATION: ABDOMEN

## 2025-03-14 ASSESSMENT — PAIN SCALES - GENERAL
PAINLEVEL_OUTOF10: 5
PAINLEVEL_OUTOF10: 8

## 2025-03-14 NOTE — PROGRESS NOTES
Department of Obstetrics and Gynecology  Labor and Delivery   Laboring Progress Note      SUBJECTIVE:  OB hospitalist  on call. I was asked to assist by performing arom.  24 year old  at 30 weeks fetal demise    OBJECTIVE    Vitals:  /60   Pulse 78   Temp (!) 96.6 °F (35.9 °C)   Resp 16   Ht 1.6 m (5' 3\")   Wt 87.1 kg (192 lb)   SpO2 98%   BMI 34.01 kg/m²     CONSTITUTIONAL:  awake, alert, cooperative, no apparent distress, and appears stated age    Cervix:             Dilation:  1 cm internally, 2 external os         Effacement:  60         Station:  -3 cm          Fetal Position:  uncertain.  Recent US vertex    Membranes:  Ruptured clear fluid 2220    Contraction frequency: q2-4 minutes      ASSESSMENT & PLAN:    30 week fetal demise  Arom accomplished   Plan to add pitocin per protocol        Electronically signed by Ashley Simpson MD on 3/13/2025 at 10:32 PM

## 2025-03-14 NOTE — FLOWSHEET NOTE
Bedside report received from Sumaya Solano RN. Patient denies needs at this time. [facility] still at bedside attending to family. RN asks patient to push her button as soon as she needs anything, otherwise RN will be in to administer Tylenol as scheduled.

## 2025-03-14 NOTE — FLOWSHEET NOTE
[facility] [place] notified fetal demise is delivered. Pt does not want the baby picked up today since she will not be going home today. We will touch base again tomorrow.

## 2025-03-14 NOTE — CARE COORDINATION
3/14/25, 9:23 AM EDT    DISCHARGE PLANNING EVALUATION    Attempted to see patient this morning, however RN reports that patient is getting ready to deliver. Asked RN To call SW when appropriate to meet with them.

## 2025-03-14 NOTE — FLOWSHEET NOTE
03/13/25 2848   Pain Assessment   Pain Level 5   Patient's Stated Pain Goal 4   Pain Location Abdomen     IV pain medication given

## 2025-03-14 NOTE — FLOWSHEET NOTE
Pt assisted to the bathroom, unable to void at this time but pt was st cath right at delivery time, vaginal bleeding small, tucks, ice applied, assisted pt getting changed, bra and pjs on. Postpartum bed brought in for pts comfort.

## 2025-03-14 NOTE — FLOWSHEET NOTE
03/13/25 2150   Provider Notification   Name of Team Member Notified [facility]   Treatment Team Role Consulting Provider   Method of Communication Call     Patient up to BR to void. POC reviewed with Dr Simpson, she will do a cervical exam and AROM if able.

## 2025-03-14 NOTE — ANESTHESIA PROCEDURE NOTES
Epidural Block    Patient location during procedure: OB  Start time: 3/14/2025 4:30 AM  End time: 3/14/2025 4:40 AM  Reason for block: labor epidural  Staffing  Performed: resident/CRNA   Anesthesiologist: Wayne Aguilar MD  Resident/CRNA: Eli Cage APRN - CRNA  Performed by: Eli Cage APRN - CRNA  Authorized by: Wayne Aguilar MD    Epidural  Patient position: sitting  Prep: ChloraPrep  Patient monitoring: continuous pulse ox and frequent blood pressure checks  Approach: midline  Location: L4-5  Injection technique: LILIYA saline  Guidance: paresthesia technique  Provider prep: mask and sterile gloves  Needle  Needle type: Tuohy   Needle gauge: 18 G  Needle length: 3.5 in  Needle insertion depth: 7 cm  Catheter type: side hole  Catheter size: 20 G  Catheter at skin depth: 12 cm  Kit: [id number]  Lot number: [id number]  Expiration date: 1/31/2026Catheter Secured: tegaderm and tape  Assessment  Hemodynamics: stable  Attempts: 1  Outcomes: uncomplicated and patient tolerated procedure well  Preanesthetic Checklist  Completed: patient identified, IV checked, site marked, risks and benefits discussed, surgical/procedural consents, equipment checked, pre-op evaluation, timeout performed, anesthesia consent given, oxygen available, monitors applied/VS acknowledged, fire risk safety assessment completed and verbalized and blood product R/B/A discussed and consented

## 2025-03-14 NOTE — PLAN OF CARE
Problem: Pain  Goal: Verbalizes/displays adequate comfort level or baseline comfort level  3/14/2025 0851 by Sumaya Solano RN  Outcome: Progressing  Flowsheets (Taken 3/14/2025 0851)  Verbalizes/displays adequate comfort level or baseline comfort level:   Encourage patient to monitor pain and request assistance   Assess pain using appropriate pain scale   Administer analgesics based on type and severity of pain and evaluate response   Implement non-pharmacological measures as appropriate and evaluate response   Consider cultural and social influences on pain and pain management   Notify Licensed Independent Practitioner if interventions unsuccessful or patient reports new pain     Problem: Vaginal Birth or  Section  Goal: Fetal and maternal status remain reassuring during the birth process  Description:  Birth OB-Pregnancy care plan goal which identifies if the fetal and maternal status remain reassuring during the birth process  3/14/2025 0851 by Sumaya Solano RN  Outcome: Progressing  Flowsheets (Taken 3/14/2025 0851)  Fetal and Maternal Status Remain Reassuring During the Birth Process:   Monitor vital signs   Monitor uterine activity   Monitor labor progression (Vaginal delivery)   Monitor fetal heart rate     Problem: Infection - Adult  Goal: Absence of infection during hospitalization  3/14/2025 0851 by Sumaya Solano RN  Outcome: Progressing  Flowsheets (Taken 3/14/2025 0851)  Absence of infection during hospitalization:   Assess and monitor for signs and symptoms of infection   Monitor lab/diagnostic results   Monitor all insertion sites i.e., indwelling lines, tubes and drains   [place] appropriate cooling/warming therapies per order   Administer medications as ordered   Instruct and encourage patient and family to use good hand hygiene technique   Identify and instruct in appropriate isolation precautions for identified infection/condition     Problem: Safety

## 2025-03-14 NOTE — FLOWSHEET NOTE
Dr Lainez on the phone, RN updated pt is complete 0-plus 1 station, tried pushing 10 min, baby wasn't moving down, RN offered pt to rest on her side since she had more pressure on her side, or keep pushing, pt wanted to go on her side. RN not wanting pt to have to push long. Comfortable with epidural.

## 2025-03-14 NOTE — PLAN OF CARE
Problem: Pain  Goal: Verbalizes/displays adequate comfort level or baseline comfort level  Outcome: Progressing  Flowsheets (Taken 3/13/2025 0757 by Sumaya Solano RN)  Verbalizes/displays adequate comfort level or baseline comfort level:   Encourage patient to monitor pain and request assistance   Assess pain using appropriate pain scale   Administer analgesics based on type and severity of pain and evaluate response   Implement non-pharmacological measures as appropriate and evaluate response   Consider cultural and social influences on pain and pain management   Notify Licensed Independent Practitioner if interventions unsuccessful or patient reports new pain  Note: Pain scale and interventions reviewed with patient.     Problem: Vaginal Birth or  Section  Goal: Fetal and maternal status remain reassuring during the birth process  Description:  Birth OB-Pregnancy care plan goal which identifies if the fetal and maternal status remain reassuring during the birth process  Outcome: Progressing  Flowsheets (Taken 3/13/2025 0757 by Sumaya Solano, RN)  Fetal and Maternal Status Remain Reassuring During the Birth Process:   Monitor vital signs   Monitor uterine activity   Monitor fetal heart rate   Monitor labor progression (Vaginal delivery)  Note: Maternal vitals per order.     Problem: Infection - Adult  Goal: Absence of infection during hospitalization  Outcome: Progressing  Flowsheets (Taken 3/13/2025 0757 by Sumaya Solano, RN)  Absence of infection during hospitalization:   Assess and monitor for signs and symptoms of infection   Monitor lab/diagnostic results   Monitor all insertion sites i.e., indwelling lines, tubes and drains   [place] appropriate cooling/warming therapies per order   Administer medications as ordered   Instruct and encourage patient and family to use good hand hygiene technique   Identify and instruct in appropriate isolation precautions for  discharge

## 2025-03-14 NOTE — PROGRESS NOTES
Pt lost her baby in the womb (fetal demise). It was an emotional phenomenon. They were encouraged and blessed.    03/14/25 1653   Encounter Summary   Encounter Overview/Reason Spiritual/Emotional Needs   Service Provided For Patient and family together   Referral/Consult From Nurse   Support System Spouse;Family members   Last Encounter  03/14/25   Complexity of Encounter Moderate   Begin Time 1300   End Time  1314   Total Time Calculated 14 min   Spiritual/Emotional needs   Type Difficult news received   Grief, Loss, and Adjustments   Type Grief and loss   Assessment/Intervention/Outcome   Assessment Anxious   Intervention Empowerment   Outcome Engaged in conversation;Encouraged

## 2025-03-14 NOTE — FLOWSHEET NOTE
[facility] [place] notified that patient would like to use their services for cremation. Hospital staff will call back when baby is delivered and ready to be picked up by  home. [phone]

## 2025-03-14 NOTE — FLOWSHEET NOTE
03/13/25 2220   Membrane/Amniotic Fluid   Membrane Status Artificial   Rupture Date 03/13/25   Rupture Time 2220   Amniotic Fluid Color Clear     SVE 1cm/60%. POC to start pitocin augmentation in the 30-60 minutes reviwed

## 2025-03-14 NOTE — FLOWSHEET NOTE
Dr Lainez at the desk updated on pt status, cervix 6-7/90/-1, vertex, baby hitting cervix well, pt comfortable with epidural.

## 2025-03-14 NOTE — FLOWSHEET NOTE
Plan of care reviewed with patient. Patient up to BR to void. Patient denies needs or questions at this time.

## 2025-03-14 NOTE — PROGRESS NOTES
23yo  @31w1d admitted for IOL for fetal demise    In to see patient. Pt did well overnight.   S/p AROM  Comfortable with epidural  SVE 6-7cm just now per RN  Continue to augment with pitocin and work toward     Vianney Lainez, DO

## 2025-03-14 NOTE — L&D DELIVERY NOTE
placenta delivered intact, whole and that the umbilical cord had three vessels noted. The perineum and vagina were explored and were found to be intact.       Vaginal sweep was performed at the conclusion of delivery and all needles were taken off the field. Sponge counts correct.      Vianney Lainez, DO

## 2025-03-14 NOTE — ANESTHESIA PRE PROCEDURE
Department of Anesthesiology  Preprocedure Note       Name:  Rakesh Rodriguez   Age:  24 y.o.  :  2000                                          MRN:  [id number]         Date:  3/14/2025      Surgeon: * No surgeons listed *    Procedure: * No procedures listed *    Medications prior to admission:   Prior to Admission medications    Medication Sig Start Date End Date Taking? Authorizing Provider   Prenatal MV-Min-Fe Fum-FA-DHA (PRENATAL 1 PO) Take 1 tablet by mouth daily   Yes Yasmany Heredia MD   Ferrous Sulfate (IRON PO) Take 1 tablet by mouth daily   Yes Provider, MD Yasmany   albuterol sulfate HFA (VENTOLIN HFA) 108 (90 Base) MCG/ACT inhaler Inhale 2 puffs into the lungs every 6 hours as needed for Wheezing   Yes ProviderYasmany MD       Current medications:    Current Facility-Administered Medications   Medication Dose Route Frequency Provider Last Rate Last Admin    naloxone 0.4 mg in 10 mL sodium chloride syringe   IntraVENous PRN Fauzia, Eli, APRN - CRNA        ondansetron (ZOFRAN) injection 4 mg  4 mg IntraVENous Q6H PRN Fauzia, Eli, APRN - CRNA        fentaNYL 750 mcg, ROPivacaine 0.1% in sodium chloride 0.9% 265mL (OB) epidural  18 mL/hr Epidural Continuous Fauzia, Eli, APRN - CRNA        ePHEDrine (EMERPHED) 5 MG/ML injection 10 mg  10 mg IntraVENous Q5 Min PRN Fauzia, Eli, APRN - CRNA        lactated ringers infusion   IntraVENous Continuous Vianney Lainez L,  mL/hr at 25 0430 New Bag at 25 0430    diphenhydrAMINE (BENADRYL) tablet 25 mg  25 mg Oral Q4H PRN Vianney Lainez L, DO        diphenoxylate-atropine (LOMOTIL) 2.5-0.025 MG per tablet 1 tablet  1 tablet Oral 4x Daily PRN Vianney Lainez, DO        ondansetron (ZOFRAN) injection 4 mg  4 mg IntraVENous Q6H PRN Vianney Lainez L, DO        methylergonovine (METHERGINE) injection 200 mcg  200 mcg IntraMUSCular PRN Vianney Lainez L, DO        carboprost (HEMABATE) injection 250 mcg  250 mcg IntraMUSCular PRN

## 2025-03-14 NOTE — FLOWSHEET NOTE
03/14/25 0410   Provider Notification   Reason for Communication Pain   Name of Team Member Notified [facility] CRNA   Treatment Team Role Consulting Provider   Method of Communication Face to face   Response At bedside     At bedside for epidural. Patient positioned on side of bed. Consent signed and time out completed.

## 2025-03-14 NOTE — FLOWSHEET NOTE
03/14/25 0644   Provider Notification   Reason for Communication Status Update   Name of Team Member Notified [facility]   Treatment Team Role Attending Provider   Method of Communication Call   Response No new orders     Notified of current status. Continue with current plan of care.

## 2025-03-14 NOTE — FLOWSHEET NOTE
Pt taken out of stirrups, baby not moving down with pushing, RN giving pt option to keep pushing or turn to her side until she feels more pressure and baby lower, pt states the pressure is stronger on her side. Pt still comfortable, family support at bedside.

## 2025-03-14 NOTE — FLOWSHEET NOTE
Agree with triage advice. No further action. Housekeeping at bedside for linens and trash change out.

## 2025-03-14 NOTE — FLOWSHEET NOTE
Nelida Liao called in for an update, pt is due for a check. They will call back later to check in.

## 2025-03-14 NOTE — FLOWSHEET NOTE
Pts family called out pt is vomiting, RN to bedside, bleeding still scant, vitals good, large emesis, pt states she is feeling better after getting sick, declines needing meds.

## 2025-03-15 VITALS
SYSTOLIC BLOOD PRESSURE: 106 MMHG | HEIGHT: 63 IN | OXYGEN SATURATION: 99 % | WEIGHT: 192 LBS | DIASTOLIC BLOOD PRESSURE: 73 MMHG | TEMPERATURE: 96.6 F | RESPIRATION RATE: 16 BRPM | BODY MASS INDEX: 34.02 KG/M2 | HEART RATE: 69 BPM

## 2025-03-15 PROCEDURE — 6370000000 HC RX 637 (ALT 250 FOR IP): Performed by: STUDENT IN AN ORGANIZED HEALTH CARE EDUCATION/TRAINING PROGRAM

## 2025-03-15 RX ORDER — IBUPROFEN 800 MG/1
800 TABLET, FILM COATED ORAL EVERY 8 HOURS SCHEDULED
Qty: 120 TABLET | Refills: 3 | Status: SHIPPED | OUTPATIENT
Start: 2025-03-15

## 2025-03-15 RX ADMIN — IBUPROFEN 800 MG: 800 TABLET, FILM COATED ORAL at 04:03

## 2025-03-15 RX ADMIN — ACETAMINOPHEN 1000 MG: 500 TABLET ORAL at 07:45

## 2025-03-15 RX ADMIN — IBUPROFEN 800 MG: 800 TABLET, FILM COATED ORAL at 12:48

## 2025-03-15 ASSESSMENT — PAIN DESCRIPTION - LOCATION
LOCATION: ABDOMEN
LOCATION: ABDOMEN

## 2025-03-15 ASSESSMENT — PAIN SCALES - GENERAL
PAINLEVEL_OUTOF10: 3
PAINLEVEL_OUTOF10: 3

## 2025-03-15 ASSESSMENT — PAIN DESCRIPTION - ORIENTATION
ORIENTATION: MID;LOWER
ORIENTATION: MID;LOWER

## 2025-03-15 ASSESSMENT — PAIN DESCRIPTION - DESCRIPTORS: DESCRIPTORS: CRAMPING

## 2025-03-15 ASSESSMENT — PAIN - FUNCTIONAL ASSESSMENT
PAIN_FUNCTIONAL_ASSESSMENT: ACTIVITIES ARE NOT PREVENTED
PAIN_FUNCTIONAL_ASSESSMENT: ACTIVITIES ARE NOT PREVENTED

## 2025-03-15 NOTE — FLOWSHEET NOTE
Patient states she is ready to eat dinner at this time, RN warms up her food from meal tray and brings it to bedside.

## 2025-03-15 NOTE — FLOWSHEET NOTE
Patient was able to eat all of dinner, RN clears tray. Patient states she needs to void, RN assists patient to bathroom.

## 2025-03-15 NOTE — FLOWSHEET NOTE
RN at bedside providing support. Pt and family tearful and want to thank the hospital staff for their excellent care.

## 2025-03-15 NOTE — FLOWSHEET NOTE
Pt requesting to walk to car. FOB and father in law went down previously to get vehicle. RN escorting pt and MIL in to car. Pt in car in satisfactory condition.

## 2025-03-15 NOTE — FLOWSHEET NOTE
Dr. Lainez in room to discus plan of care, pt voiced understanding.  Pt plans to discharged this morning. Would like to be here when her son is given over to  home and then will let RN know they are ready for discharge

## 2025-03-15 NOTE — FLOWSHEET NOTE
Bedside report given to YASIR Nash RN. Patient doing well, has been up to bathroom and breakfast tray just delivered. Patient reports bleeding still minimal.

## 2025-03-15 NOTE — FLOWSHEET NOTE
Patient able to void large amount, no pain with urination. Bleeding minimal, pad changed and siri care performed per patient.

## 2025-03-15 NOTE — DISCHARGE SUMMARY
Obstetric Discharge Summary      Pt Name: Rakesh Rodriguez  MRN: [id number] Acct #: [id number]  YOB: 2000        Admitting Diagnosis  IUP @31w1d  Fetal demise, known fetal cardiac defect    OB History          2    Para   2    Term           2    AB        Living             SAB        IAB        Ectopic        Molar        Multiple   0    Live Births                    Reasons for Admission on 3/13/2025  5:53 AM  Fetal demise in pan pregnancy greater than 22 weeks gestation, antepartum [O36.4XX0]  No comment available  IOL      Intrapartum Procedures        Multiple birth?: No           None      Postpartum/Operative Complications     Ten minute shoulder dystocia    [place] Data  Information for the patient's :  Hola Rodriguez Pending Rakesh FD [[id number]]   child   Birth Weight: 2.183 kg (4 lb 13 oz)    Discharge Diagnosis     S/p     Discharge Information  Current Discharge Medication List        START taking these medications    Details   ibuprofen (ADVIL;MOTRIN) 800 MG tablet Take 1 tablet by mouth every 8 hours  Qty: 120 tablet, Refills: 3           CONTINUE these medications which have NOT CHANGED    Details   Prenatal MV-Min-Fe Fum-FA-DHA (PRENATAL 1 PO) Take 1 tablet by mouth daily      albuterol sulfate HFA (VENTOLIN HFA) 108 (90 Base) MCG/ACT inhaler Inhale 2 puffs into the lungs every 6 hours as needed for Wheezing           STOP taking these medications       Ferrous Sulfate (IRON PO) Comments:   Reason for Stopping:               No discharge procedures on file.    Vaginal Delivery  Diet regular  Condition Good    Discharge to:  home  Follow up in 1-2 weeks    Date of delivery 3/14/25    Hola Rodriguez Pending Rakesh FD [[id number]]      Labor Events     Labor: No   Steroids: Full Course  Cervical Ripening Date/Time:      Cervical Ripening Type: Misoprostol  Antibiotics Received during Labor: No  Rupture Date/Time:  3/13/25 22:20:00   Rupture  Type: AROM  Fluid Color: Pink  Fluid Odor: None  Fluid Volume: Scant  Induction: Cervidil, Oxytocin, AROM  Augmentation: Oxytocin  Labor Complications: Shoulder Dystocia   OB: DELIVERY - COMPLICATIONS    Fetal demise affecting delivery          Anesthesia    Method: Epidural       Labor Event Times      Labor onset date/time:  3/14/25 01:00:00     Dilation complete date/time:  3/14/25 09:15:00 EDT     Start pushing date/time:  3/14/2025 09:22:00   Decision date/time (emergent ):            Labor Length    1st stage: 8h 15m  2nd stage: 2h 43m  3rd stage: 0h 05m       Delivery Details      Delivery Date: 3/14/25 Delivery Time: 11:58:00   Delivery Type: Vaginal, Spontaneous               Presentation    Presentation: Vertex  _: Occiput  _: Posterior       Shoulder Dystocia    Shoulder Dystocia Present?: Yes  Time Recognized: 3/14/2025 11:46:00    Gentle Attempt at Traction, Assisted by Maternal Expulsive Forces?: Yes       Assisted Delivery Details    Forceps Attempted?: No  Vacuum Extractor Attempted?: No                           Cord                  Placenta    Date/Time: 3/14/2025 12:03:00  Removal: Spontaneous  Appearance: Intact  Disposition: Placenta Refrigerator       Lacerations    Episiotomy: None  Perineal Lacerations: None  Other Lacerations: no non-perineal laceration  Number of Repair Packets: 0       Vaginal Counts    Initial Count Personnel: BIGG NEUENSCHWANDER RN SPONGES 5,INSTR 16,NEEDLE 1  Initial Count Verified By: BIGG DE LEON RN  Intial Sponge Count: Correct Intial Needles Count: Correct Intial Instruments Count: Correct   Final Sponges Count: Correct Final Needles  Count: Correct Final Instruments Count: Correct   Final Count Personnel: DR CHIU SPONGES 5,NEEDLE 1  Final Count Verified By: BIGG DE LEON INSTR 16  Accurate Final Count?: Yes       Blood Loss  Mother: Rakesh Rodriguez #[id number]     Start of Mother's Information      Delivery Blood Loss

## 2025-03-15 NOTE — FLOWSHEET NOTE
Nahun from [facility] is present. Paperwork completed and signed. Copies given. Nahun into talk with family and complete their paperwork. Pt family supportive at bedside

## 2025-03-15 NOTE — FLOWSHEET NOTE
Sufficient Yanni leaves unit, states patient is coping well and that she understands her options regarding the  home taking the baby either before or after the mother is discharged from the hospital.

## 2025-03-15 NOTE — PLAN OF CARE
Problem: Discharge Planning  Goal: Discharge to home or other facility with appropriate resources  3/15/2025 0812 by Samantha Nash RN  Outcome: Progressing  Flowsheets (Taken 3/15/2025 0812)  Discharge to home or other facility with appropriate resources:   Identify barriers to discharge with patient and caregiver   Arrange for needed discharge resources and transportation as appropriate   Identify discharge learning needs (meds, wound care, etc)     Problem: Depression  Goal: Will be euthymic at discharge  Description: INTERVENTIONS:  1. Administer medication as ordered  2. Provide emotional support via 1:1 interaction with staff  3. Encourage involvement in milieu/groups/activities  4. Monitor for social isolation  3/15/2025 0812 by Samantha Nash RN  Outcome: Progressing  Note: Discussion with pt and FOB regarding emotions following discharge, encouraged to call MD if concerns      Problem: Anxiety  Goal: Will report anxiety at manageable levels  Description: INTERVENTIONS:  1. Administer medication as ordered  2. Teach and rehearse alternative coping skills  3. Provide emotional support with 1:1 interaction with staff  3/15/2025 0812 by Samantha Nash RN  Outcome: Progressing  Flowsheets  Taken 3/15/2025 0812  Will report anxiety at manageable levels: Provide emotional support with 1:1 interaction with staff  Taken 3/15/2025 0745  Will report anxiety at manageable levels: Provide emotional support with 1:1 interaction with staff     Problem: Safety - Adult  Goal: Free from fall injury  3/15/2025 0812 by Samantha Nash RN  Outcome: Progressing  Flowsheets (Taken 3/15/2025 0812)  Free From Fall Injury: Instruct family/caregiver on patient safety  Note: Call light within reach     Problem: Safety - Adult  Goal: Free from fall injury  Recent Flowsheet Documentation  Taken 3/15/2025 0812 by Samantha Nash RN  Free From Fall Injury: Instruct family/caregiver on patient safety  Taken 3/14/2025 2141 by  Celine Polanco, RN  Free From Fall Injury: Instruct family/caregiver on patient safety    Pt and FOB both voice understanding.

## 2025-03-15 NOTE — FLOWSHEET NOTE
Nahun from [facility]  escorting baby out of the hospital.   RN calling and notifying [facility] Supervisor of pt, fetal demise and baby being transported to [facility] per Nahun. Paperwork to go to admitting.

## 2025-03-15 NOTE — FLOWSHEET NOTE
Patient informs RN that she is feeling crampy and would like her motrin now. Previously had declined. Patient did let RN know she has been up the bathroom and is voiding without difficulty, and experiencing minimal bleeding. RN examines fundus, finds it firm and midline.

## 2025-03-15 NOTE — DISCHARGE INSTRUCTIONS
Intrauterine Fetal Death (IUFD): After Your Visit   Your Care Instructions   The loss of a baby is devastating and very hard to accept. You may wonder why it happened or blame yourself. But a stillbirth can happen even in a pregnancy that has been going well.   In the weeks to come, try to take care of yourself physically and emotionally.   Make a follow-up appointment for __1-2 weeks__________   with Dr. Mckeon____________  Follow-up care is a key part of your treatment and safety. Be sure to make and go to all appointments, and call your doctor if you are having problems. It's also a good idea to know your test results and keep a list of the medicines you take.   How can you care for yourself at home?   Taking care of your body   Use pads instead of tampons for the bloody flow that may last as long as 2 weeks.   Ease soreness of hemorrhoids and the area between your vagina and rectum with ice compresses or witch hazel pads.   Ease constipation by drinking lots of fluid and eating high-fiber foods. Ask your doctor about over-the-counter stool softeners.   Cleanse yourself with a gentle squeeze of warm water from a bottle instead of wiping with toilet paper.   Take a sitz bath in warm water several times a day.  Wait until you are healed (about 4 to 6 weeks) before you have sexual intercourse. Your doctor will tell you when it is okay to have sex.   Getting regular exercise, as soon as you are able, may help you feel better.    Dealing with your grief   Rest whenever you can. Being tired makes it harder to handle your emotions.   Tell your family and friends what they can do. You may want to spend time alone, or you may seek the comfort of family and friends.   Try to eat healthy foods, get some sleep, and get exercise (or just get out of the house) to help you feel strong as you heal.   Talk to your doctor about how you are coping. He or she will want to watch you for signs of depression. You may want to have  counseling for support and to help you express your feelings.   We have begun to create memories of your pregnancy and baby for you in your memory box along with additional information to assist you during this time of loss.  You also may want to talk to others who have gone through this loss. You can make connections online or in person:   The Comp[employer]ionate Friends is a resource for people who have lost a child. The group can help put you in touch with one of its support groups in your area. The web site is www.compassionatefriends.org.   Share ([employer]regnancy [employer] also can offer advice and connections to others who have lost a child. The group's web site is www.Abeelo.org.   The International Stillbirth Equality also offers support and resources. Its web site is www.stillbirthalliance.org.    When should you call for help?   Call 911 anytime you think you may need emergency care. For example, call if:   You have sudden, severe pain in your belly.   You passed out (lost consciousness).  Call your doctor now or seek immediate medical care if:   You have severe vaginal bleeding. This means that you are soaking through a pad each hour for 2 or more hours.   You have a fever.   You have new or more belly pain.   You are dizzy or lightheaded, or you feel like you may faint.  Watch closely for changes in your health, and be sure to contact your doctor if:   You feel sad, anxious, or hopeless for more than a few days.   You have new or worse vaginal discharge.                         Your vaginal bleeding isn't decreasing.

## 2025-03-15 NOTE — FLOWSHEET NOTE
Discharge instructions reviewed, pt and FOB both voice understanding. RN to wait to call  Home until their family has arrived

## 2025-03-15 NOTE — PROGRESS NOTES
Department of Obstetrics and Gynecology  Labor and Delivery  Attending Post Partum Progress Note    PPD #1    SUBJECTIVE: Feeling well. No complaints. Bleeding has been minimal. Voiding spontaneously. Ambulating without lightheadedness. Emotionally coping well.  Would like to go home today.    OBJECTIVE:     Vitals:  /73   Pulse 69   Temp (!) 96.6 °F (35.9 °C) (Oral)   Resp 16   Ht 1.6 m (5' 3\")   Wt 87.1 kg (192 lb)   SpO2 99%   BMI 34.01 kg/m²     Uterus:  normal size, well involuted, firm, non-tender    DATA:    Hemoglobin:   Lab Results   Component Value Date/Time    HGB 12.2 03/13/2025 06:57 AM       ASSESSMENT & PLAN:    Doing well.  Dispo: Pt requesting home today  Follow up in 1-2 weeks.      Vianney Lainez,  3/15/2025

## 2025-03-15 NOTE — PLAN OF CARE
Problem: Discharge Planning  Goal: Discharge to home or other facility with appropriate resources  Outcome: Progressing  Flowsheets (Taken 3/14/2025 0851 by Sumaya Solano, RN)  Discharge to home or other facility with appropriate resources:   Identify barriers to discharge with patient and caregiver   Arrange for needed discharge resources and transportation as appropriate   Identify discharge learning needs (meds, wound care, etc)   Arrange for interpreters to assist at discharge as needed   Refer to discharge planning if patient needs post-hospital services based on physician order or complex needs related to functional status, cognitive ability or social support system     Problem: Depression  Goal: Will be euthymic at discharge  Description: INTERVENTIONS:  1. Administer medication as ordered  2. Provide emotional support via 1:1 interaction with staff  3. Encourage involvement in milieu/groups/activities  4. Monitor for social isolation  Outcome: Progressing     Problem: Anxiety  Goal: Will report anxiety at manageable levels  Description: INTERVENTIONS:  1. Administer medication as ordered  2. Teach and rehearse alternative coping skills  3. Provide emotional support with 1:1 interaction with staff  Outcome: Progressing  Flowsheets (Taken 3/14/2025 2141)  Will report anxiety at manageable levels: Teach and rehearse alternative coping skills     Problem: Safety - Adult  Goal: Free from fall injury  Outcome: Progressing  Flowsheets  Taken 3/14/2025 2141 by Celine Polanco, RN  Free From Fall Injury: Instruct family/caregiver on patient safety  Taken 3/14/2025 0851 by Sumaya Solano, RN  Free From Fall Injury:   Instruct family/caregiver on patient safety   Based on caregiver fall risk screen, instruct family/caregiver to ask for assistance with transferring infant if caregiver noted to have fall risk factors

## 2025-03-15 NOTE — FLOWSHEET NOTE
[facility] called and notified the pt wishes and for them to come  the baby. The family would like to hand the baby over to the  home before discharge. They are currently with a family, will be up in a little bit. RN down to discuss POC with pt. Pt family at bedside. Support and refreshments given.

## 2025-03-17 NOTE — ANESTHESIA POSTPROCEDURE EVALUATION
Department of Anesthesiology  Postprocedure Note    Patient: Rakesh Rodriguez  MRN: [id number]  YOB: 2000  Date of evaluation: 3/17/2025    Procedure Summary       Date: 03/14/25 Room / Location:     Anesthesia Start: 0410 Anesthesia Stop: 1158    Procedure: Labor Analgesia Diagnosis:     Scheduled Providers:  Responsible Provider: Wayne Aguilar MD    Anesthesia Type: epidural ASA Status: 2            Anesthesia Type: No value filed.    Oriana Phase I:      Oriana Phase II:      Anesthesia Post Evaluation    Comments: Patient discharged.  Chart reviewed.  No apparent anesthesia complications.        No notable events documented.